# Patient Record
Sex: FEMALE | URBAN - METROPOLITAN AREA
[De-identification: names, ages, dates, MRNs, and addresses within clinical notes are randomized per-mention and may not be internally consistent; named-entity substitution may affect disease eponyms.]

---

## 2023-02-28 ENCOUNTER — OFFICE VISIT (OUTPATIENT)
Dept: ENT CLINIC | Age: 43
End: 2023-02-28
Payer: COMMERCIAL

## 2023-02-28 VITALS
WEIGHT: 160 LBS | DIASTOLIC BLOOD PRESSURE: 82 MMHG | RESPIRATION RATE: 19 BRPM | SYSTOLIC BLOOD PRESSURE: 118 MMHG | OXYGEN SATURATION: 98 % | HEART RATE: 81 BPM | HEIGHT: 68 IN | BODY MASS INDEX: 24.25 KG/M2

## 2023-02-28 DIAGNOSIS — U09.9 LONG COVID: ICD-10-CM

## 2023-02-28 DIAGNOSIS — T78.40XA ALLERGY, INITIAL ENCOUNTER: Primary | ICD-10-CM

## 2023-02-28 PROCEDURE — 99203 OFFICE O/P NEW LOW 30 MIN: CPT | Performed by: NURSE PRACTITIONER

## 2023-02-28 RX ORDER — MONTELUKAST SODIUM 10 MG/1
10 TABLET ORAL DAILY
Qty: 1 TABLET | Refills: 2 | Status: SHIPPED | OUTPATIENT
Start: 2023-02-28 | End: 2023-03-02

## 2023-02-28 RX ORDER — ALBUTEROL SULFATE 0.83 MG/ML
SOLUTION RESPIRATORY (INHALATION)
COMMUNITY

## 2023-02-28 RX ORDER — IPRATROPIUM BROMIDE AND ALBUTEROL SULFATE 2.5; .5 MG/3ML; MG/3ML
3 SOLUTION RESPIRATORY (INHALATION)
COMMUNITY

## 2023-02-28 NOTE — PROGRESS NOTES
Subjective:    Melvin Senderika   40 y.o.   1980     New Patient Visit  This is a 40 y.o. female who is here today to discuss loss of taste and smell during COVID infection about a year ago. She states she developed parosmia, she is able now to eat some things. She has been taking supplements and believes this has made things better. She is now having a sensation of having water in her nose, and pain in the bridge of the nose, along with pressure. She also states when she opens her mouth she would get pain in face that radiated over the right eye. Her COVID infection was in December 2021. Most symptoms have resolved, she still has sinus pressure. Symptoms are sporadic. Review of Systems  Review of Systems   Constitutional:  Positive for weight loss. Negative for chills, diaphoresis, fever and malaise/fatigue. HENT:  Positive for congestion, ear pain, hearing loss (stuffy or foggy sound), sinus pain and sore throat. Negative for ear discharge, nosebleeds and tinnitus. Eyes:  Positive for blurred vision and photophobia. Negative for double vision, pain, discharge and redness. Respiratory:  Positive for cough, sputum production, shortness of breath and wheezing. Negative for hemoptysis and stridor. Cardiovascular:  Positive for chest pain, palpitations, orthopnea, claudication and leg swelling. Negative for PND. Gastrointestinal:  Positive for abdominal pain, constipation, diarrhea, heartburn and nausea. Negative for blood in stool, melena and vomiting. Genitourinary:  Positive for frequency and urgency. Negative for dysuria, flank pain and hematuria. Musculoskeletal:  Positive for joint pain, myalgias and neck pain. Negative for back pain and falls. Skin:  Positive for itching and rash. Has always had this, she will break out from sun exposure.     Neurological:  Positive for dizziness, tingling, tremors, sensory change, speech change (developing a stutter), weakness and headaches. Negative for focal weakness, seizures and loss of consciousness. Endo/Heme/Allergies:  Positive for environmental allergies. Negative for polydipsia. Bruises/bleeds easily. Psychiatric/Behavioral:  Negative for depression, hallucinations, memory loss, substance abuse and suicidal ideas. The patient is nervous/anxious and has insomnia. No past medical history on file. No past surgical history on file. No family history on file. Social History     Tobacco Use    Smoking status: Not on file    Smokeless tobacco: Not on file   Substance Use Topics    Alcohol use: Not on file      Prior to Admission medications    Not on File        No Known Allergies      Objective: There were no vitals taken for this visit. Physical Exam  Constitutional:       General: She is not in acute distress. Appearance: Normal appearance. She is normal weight. She is not ill-appearing, toxic-appearing or diaphoretic. HENT:      Head: Normocephalic and atraumatic. Right Ear: Tympanic membrane and external ear normal. There is no impacted cerumen. Left Ear: Tympanic membrane and external ear normal. There is no impacted cerumen. Ears:      Comments: Erythematous ear canals, advised to stop using q-tips     Nose: Congestion present. No rhinorrhea. Mouth/Throat:      Mouth: Mucous membranes are dry. Pharynx: Oropharynx is clear. No oropharyngeal exudate or posterior oropharyngeal erythema. Eyes:      General: No scleral icterus. Right eye: No discharge. Left eye: No discharge. Extraocular Movements: Extraocular movements intact. Conjunctiva/sclera: Conjunctivae normal.      Pupils: Pupils are equal, round, and reactive to light. Neck:      Vascular: No carotid bruit. Cardiovascular:      Rate and Rhythm: Normal rate and regular rhythm. Heart sounds: No murmur heard. No friction rub. No gallop.    Pulmonary:      Effort: Pulmonary effort is normal.      Breath sounds: Normal breath sounds. Abdominal:      General: Abdomen is flat. Palpations: Abdomen is soft. Musculoskeletal:         General: No swelling, tenderness, deformity or signs of injury. Normal range of motion. Cervical back: Normal range of motion and neck supple. No rigidity or tenderness. Right lower leg: No edema. Left lower leg: No edema. Skin:     General: Skin is warm and dry. Capillary Refill: Capillary refill takes less than 2 seconds. Coloration: Skin is not jaundiced or pale. Findings: No bruising, erythema, lesion or rash. Neurological:      General: No focal deficit present. Mental Status: She is alert and oriented to person, place, and time. Mental status is at baseline. Psychiatric:         Mood and Affect: Mood normal.         Behavior: Behavior normal.         Thought Content: Thought content normal.         Judgment: Judgment normal.       Assessment/Plan:     Encounter Diagnoses   Name Primary?     Allergy, initial encounter Yes    Long COVID      Orders Placed This Encounter    albuterol (PROVENTIL VENTOLIN) 2.5 mg /3 mL (0.083 %) nebu    albuterol-ipratropium (DUO-NEB) 2.5 mg-0.5 mg/3 ml nebu   Chronic allergies and hyper inflammatory  response  -Recommend allergy testing, she is not ready to go down that route   -Will start OTC loratadine, fluticasone, Singulair  -Recommend follow-up with PCP for routine labs (CBC with diff, CMP, sed rate)  -Follow up in 2 months to reassess            Thank you for referring this patient,    Murphy Redd AGACNP-BC     [address]  [place]  [phone]

## 2023-03-02 ENCOUNTER — TELEPHONE (OUTPATIENT)
Dept: ENT CLINIC | Age: 43
End: 2023-03-02

## 2023-03-02 DIAGNOSIS — T78.40XA ALLERGY, INITIAL ENCOUNTER: ICD-10-CM

## 2023-03-02 RX ORDER — MONTELUKAST SODIUM 10 MG/1
10 TABLET ORAL DAILY
Qty: 90 TABLET | Refills: 0 | Status: SHIPPED | OUTPATIENT
Start: 2023-03-02 | End: 2023-05-31

## 2023-04-26 ENCOUNTER — OFFICE VISIT (OUTPATIENT)
Dept: ENT CLINIC | Age: 43
End: 2023-04-26
Payer: COMMERCIAL

## 2023-04-26 VITALS
BODY MASS INDEX: 24.55 KG/M2 | SYSTOLIC BLOOD PRESSURE: 120 MMHG | HEART RATE: 95 BPM | WEIGHT: 162 LBS | HEIGHT: 68 IN | OXYGEN SATURATION: 99 % | DIASTOLIC BLOOD PRESSURE: 76 MMHG

## 2023-04-26 DIAGNOSIS — U09.9 LONG COVID: ICD-10-CM

## 2023-04-26 DIAGNOSIS — Z91.09 ENVIRONMENTAL ALLERGIES: Primary | ICD-10-CM

## 2023-04-26 DIAGNOSIS — T78.40XA ALLERGY, INITIAL ENCOUNTER: ICD-10-CM

## 2023-04-26 PROCEDURE — 99213 OFFICE O/P EST LOW 20 MIN: CPT | Performed by: NURSE PRACTITIONER

## 2023-04-26 RX ORDER — EPINEPHRINE 0.3 MG/.3ML
0.3 INJECTION SUBCUTANEOUS
Qty: 1 EACH | Refills: 0 | Status: SHIPPED | OUTPATIENT
Start: 2023-04-26 | End: 2023-04-26

## 2023-04-26 NOTE — PROGRESS NOTES
Subjective:    Fredy Hollis   40 y.o.   1980     New Patient Visit  2/28/2023 : This is a 40 y.o. female who is here today to discuss loss of taste and smell during COVID infection about a year ago. She states she developed parosmia, she is able now to eat some things. She has been taking supplements and believes this has made things better. She is now having a sensation of having water in her nose, and pain in the bridge of the nose, along with pressure. She also states when she opens her mouth she would get pain in face that radiated over the right eye. Her COVID infection was in December 2021. Most symptoms have resolved, she still has sinus pressure. Symptoms are sporadic. Interval History  4/26/2023 Today she is here for follow up after being seen on 2/28/2023. She states with the medication she has not noticed a reduction in her symptoms. She states she feels as though it was clearing out her chest, but the pressure was causing her to have panic like symptoms. She has been taking the montelukast sporadically when she feels like she needs it. She thinks she has developed an allergy to nuts as well. She has changes the way she has been eating. She states she was eating something with nuts and she was wheezing, and had a rash on her chest. She states that her ears feel about the same with pressure and pain. She has woken up a few times feeling like she could not breathe, and feeling like she was having an anxiety attack, she took her inhaler, and drank sleepy-time tea and once she calmed down. Review of Systems  Review of Systems   Constitutional:  Positive for weight loss. Negative for chills, diaphoresis, fever and malaise/fatigue. HENT:  Positive for congestion, ear pain, hearing loss (stuffy or foggy sound), sinus pain and sore throat. Negative for ear discharge, nosebleeds and tinnitus. Eyes:  Positive for blurred vision and photophobia.  Negative for double vision, pain, discharge and redness. Respiratory:  Positive for cough, sputum production, shortness of breath and wheezing. Negative for hemoptysis and stridor. Cardiovascular:  Positive for chest pain, palpitations, orthopnea, claudication and leg swelling. Negative for PND. Gastrointestinal:  Positive for abdominal pain, constipation, diarrhea, heartburn and nausea. Negative for blood in stool, melena and vomiting. Genitourinary:  Positive for frequency and urgency. Negative for dysuria, flank pain and hematuria. Musculoskeletal:  Positive for joint pain, myalgias and neck pain. Negative for back pain and falls. Skin:  Positive for itching and rash. Has always had this, she will break out from sun exposure. Neurological:  Positive for dizziness, tingling, tremors, sensory change, speech change (developing a stutter), weakness and headaches. Negative for focal weakness, seizures and loss of consciousness. Endo/Heme/Allergies:  Positive for environmental allergies. Negative for polydipsia. Bruises/bleeds easily. Psychiatric/Behavioral:  Negative for depression, hallucinations, memory loss, substance abuse and suicidal ideas. The patient is nervous/anxious and has insomnia. History reviewed. No pertinent past medical history. History reviewed. No pertinent surgical history. History reviewed. No pertinent family history. Social History     Tobacco Use    Smoking status: Every Day     Packs/day: 0.50     Years: 27.00     Pack years: 13.50     Types: Cigarettes    Smokeless tobacco: Never   Substance Use Topics    Alcohol use: Yes     Comment: socially      Prior to Admission medications    Medication Sig Start Date End Date Taking? Authorizing Provider   montelukast (SINGULAIR) 10 mg tablet Take 1 Tablet by mouth daily for 90 days.  3/2/23 5/31/23 Yes Angus Anthony NP   albuterol (PROVENTIL VENTOLIN) 2.5 mg /3 mL (0.083 %) nebu albuterol sulfate 2.5 mg/3 mL (0.083 %) solution for nebulization   USE 3 ML VIAL 4 TIMES A DAY BY NEBULIZATION ROUTE. Yes Provider, Historical   albuterol-ipratropium (DUO-NEB) 2.5 mg-0.5 mg/3 ml nebu 3 mL. Yes Provider, Historical        No Known Allergies      Objective:     Visit Vitals  /76 (BP 1 Location: Left lower arm, BP Patient Position: Sitting, BP Cuff Size: Adult)   Pulse 95   Ht 5' 8\" (1.727 m)   Wt 162 lb (73.5 kg)   SpO2 99%   BMI 24.63 kg/m²        Physical Exam  Constitutional:       General: She is not in acute distress. Appearance: Normal appearance. She is normal weight. She is not ill-appearing, toxic-appearing or diaphoretic. HENT:      Head: Normocephalic and atraumatic. Right Ear: Tympanic membrane, ear canal and external ear normal. There is no impacted cerumen. Left Ear: Tympanic membrane, ear canal and external ear normal. There is no impacted cerumen. Nose: Congestion present. No rhinorrhea. Mouth/Throat:      Mouth: Mucous membranes are dry. Pharynx: Oropharynx is clear. No oropharyngeal exudate or posterior oropharyngeal erythema. Eyes:      General: No scleral icterus. Right eye: No discharge. Left eye: No discharge. Extraocular Movements: Extraocular movements intact. Conjunctiva/sclera: Conjunctivae normal.      Pupils: Pupils are equal, round, and reactive to light. Neck:      Vascular: No carotid bruit. Cardiovascular:      Rate and Rhythm: Normal rate and regular rhythm. Heart sounds: No murmur heard. No friction rub. No gallop. Pulmonary:      Effort: Pulmonary effort is normal.      Breath sounds: Normal breath sounds. Abdominal:      General: Abdomen is flat. Palpations: Abdomen is soft. Musculoskeletal:         General: No swelling, tenderness, deformity or signs of injury. Normal range of motion. Cervical back: Normal range of motion and neck supple. No rigidity or tenderness. Right lower leg: No edema. Left lower leg: No edema.    Skin:     General: Skin is warm and dry. Capillary Refill: Capillary refill takes less than 2 seconds. Coloration: Skin is not jaundiced or pale. Findings: No bruising, erythema, lesion or rash. Neurological:      General: No focal deficit present. Mental Status: She is alert and oriented to person, place, and time. Mental status is at baseline. Psychiatric:         Mood and Affect: Mood normal.         Behavior: Behavior normal.         Thought Content: Thought content normal.         Judgment: Judgment normal.       Assessment/Plan:     Encounter Diagnoses   Name Primary?     Environmental allergies Yes    Allergy, initial encounter     Long COVID      No orders of the defined types were placed in this encounter.  - Chronic allergies and hyper inflammatory  response  - Recommend allergy testing, will refer to Dr. Onur Ovalle for this as she has concerns for food allergies as well as environmental allergies  - Continue loratadine, fluticasone, Singulair  - Had labs with PCP which are unremarkable             Thank you for referring this patient,    Maxwellbecky [facility]     [address]  [place]  [phone]

## 2023-06-27 DIAGNOSIS — Z91.09 OTHER ALLERGY STATUS, OTHER THAN TO DRUGS AND BIOLOGICAL SUBSTANCES: ICD-10-CM

## 2023-06-27 DIAGNOSIS — T78.40XA ALLERGY, UNSPECIFIED, INITIAL ENCOUNTER: Primary | ICD-10-CM

## 2023-06-27 RX ORDER — MONTELUKAST SODIUM 10 MG/1
10 TABLET ORAL DAILY
Qty: 30 TABLET | Refills: 2 | Status: SHIPPED | OUTPATIENT
Start: 2023-06-27 | End: 2023-09-25